# Patient Record
Sex: FEMALE | Race: AMERICAN INDIAN OR ALASKA NATIVE
[De-identification: names, ages, dates, MRNs, and addresses within clinical notes are randomized per-mention and may not be internally consistent; named-entity substitution may affect disease eponyms.]

---

## 2022-06-10 ENCOUNTER — HOSPITAL ENCOUNTER (EMERGENCY)
Dept: HOSPITAL 5 - ED | Age: 22
Discharge: HOME | End: 2022-06-10
Payer: SELF-PAY

## 2022-06-10 VITALS — DIASTOLIC BLOOD PRESSURE: 65 MMHG | SYSTOLIC BLOOD PRESSURE: 117 MMHG

## 2022-06-10 DIAGNOSIS — Z79.899: ICD-10-CM

## 2022-06-10 DIAGNOSIS — J06.9: Primary | ICD-10-CM

## 2022-06-10 PROCEDURE — 71046 X-RAY EXAM CHEST 2 VIEWS: CPT

## 2022-06-10 PROCEDURE — 99283 EMERGENCY DEPT VISIT LOW MDM: CPT

## 2022-06-10 NOTE — XRAY REPORT
CHEST 2 VIEWS 



INDICATION / CLINICAL INFORMATION:

SOB.



COMPARISON: 

None available.



FINDINGS:



SUPPORT DEVICES: None.



HEART / MEDIASTINUM: No significant abnormality. 



LUNGS / PLEURA: No significant pulmonary or pleural abnormality. No pneumothorax. 



ADDITIONAL FINDINGS: No significant additional findings.



IMPRESSION:

1. No acute findings.



Signer Name: Osorio Ulrich Jr, MD 

Signed: 6/10/2022 3:17 PM

Workstation Name: [id number]

## 2022-06-10 NOTE — EMERGENCY DEPARTMENT REPORT
- General


Chief Complaint: Dyspnea/Respdistress


Stated Complaint: SHORTNESS OF BREATH


Time Seen by Provider: 06/10/22 21:10


Source: patient


Mode of arrival: Ambulatory


Limitations: No Limitations





- History of Present Illness


Initial Comments: 





21-year-old female presents emerged department complaining of having an asthma 

attack last week associated with wheezing and shortness of breath has been 

having some issues fully getting over the episode.  States that occasionally she

may feel [place] sensation to her chest in the form of cough for wheeze like 

aspect which triggers a degree of anxiety and breathlessness so decided come to 

emergency department to ensure that she did not have pneumonia as was 

recommended or insinuated by her friends.  She reports no hemoptysis no hematem

esis medic easy, no fever, chills, sweats


MD Complaint: rhinorrhea


-: Gradual


Severity: mild


Improves With: nothing


Worsens With: nothing


Context: sick contacts


Associated Symptoms: rhinorrhea.  denies: nasal congestion, sore throat, nausea,

vomiting, diarrhea, rash, hoarseness





- Related Data


                                  Previous Rx's











 Medication  Instructions  Recorded  Last Taken  Type


 


Albuterol Mdi (or & Nicu Only) 1 puff IH Q4-6H PRN #1 inha 06/10/22 Unknown Rx





[ProAir HFA Inhaler]    











                                    Allergies











Allergy/AdvReac Type Severity Reaction Status Date / Time


 


No Known Allergies Allergy   Verified 06/10/22 14:53














ED Review of Systems


ROS: 


Stated complaint: SHORTNESS OF BREATH


Other details as noted in HPI





Comment: All other systems reviewed and negative





ED Past Medical Hx





- Medications


Home Medications: 


                                Home Medications











 Medication  Instructions  Recorded  Confirmed  Last Taken  Type


 


Albuterol Mdi (or & Nicu Only) 1 puff IH Q4-6H PRN #1 inha 06/10/22  Unknown Rx





[ProAir HFA Inhaler]     














ED Physical Exam





- General


Limitations: No Limitations


General appearance: alert, in no apparent distress





- Head


Head exam: Present: atraumatic, normocephalic





- Eye


Eye exam: Present: normal appearance, PERRL, EOMI





- ENT


ENT exam: Present: mucous membranes moist





- Neck


Neck exam: Present: normal inspection, full ROM





- Respiratory


Respiratory exam: Present: normal lung sounds bilaterally.  Absent: respiratory 

distress





- Cardiovascular


Cardiovascular Exam: Present: regular rate, normal rhythm.  Absent: systolic 

murmur, diastolic murmur, rubs, gallop





- GI/Abdominal


GI/Abdominal exam: Present: soft, normal bowel sounds





- Extremities Exam


Extremities exam: Present: normal inspection





- Back Exam


Back exam: Present: normal inspection





- Neurological Exam


Neurological exam: Present: alert, oriented X3





- Psychiatric


Psychiatric exam: Present: normal affect, normal mood





- Skin


Skin exam: Present: warm, dry, intact, normal color.  Absent: rash





ED Course


                                   Vital Signs











  06/10/22





  14:55


 


Temperature 98 F


 


Pulse Rate 98 H


 


Respiratory 16





Rate 


 


Blood Pressure 119/71





[Left] 


 


O2 Sat by Pulse 98





Oximetry 














ED Medical Decision Making





- Radiology Data


Radiology results: report reviewed





[facility]  


                                     [address] [place] [address]  


 


                                            XRay Report   


                                               Signed  


 


Patient: ZENA OLEARY                                                     

           MR#: [id number]  


[id number]          


: 2000                                                                

Acct:[id number]      


 


Age/Sex: 21 / F                                                                

ADM Date: 06/10/22     


 


Loc: ED       


Attending Dr:   


 


 


Ordering Physician: ED MD CHITO  


Date of Service: 06/10/22  


Procedure(s): XR chest routine 2V  


Accession Number(s): [id number]  


 


cc: ED DOC, MD   


 


Fluoro Time In Minutes:   


 


CHEST 2 VIEWS   


 


 INDICATION / CLINICAL INFORMATION:  


 SOB.  


 


 COMPARISON:   


 None available.  


 


 FINDINGS:  


 


 SUPPORT DEVICES: None.  


 


 HEART / MEDIASTINUM: No significant abnormality.   


 


 LUNGS / PLEURA: No significant pulmonary or pleural abnormality. No 

pneumothorax.   


 


 ADDITIONAL FINDINGS: No significant additional findings.  


 


 IMPRESSION:  


 1. No acute findings.  


 


 Signer Name: Osorio Rose Jr, MD   


 Signed: 6/10/2022 3:17 PM  


 Workstation Name: [id number]   


 


 


Transcribed By: TTR  


Dictated By: OSORIO ROSE JR, MD  


Electronically Authenticated By: OSORIO ROSE JR, MD    


Signed Date/Time: 06/10/22 1517                                


 


 


 


DD/DT: 06/10/22 1517                                                            

  


TD/TT:

















Print


Critical care attestation.: 


If time is entered above; I have spent that time in minutes in the direct care 

of this critically ill patient, excluding procedure time.








ED Disposition


Clinical Impression: 


 URI (upper respiratory infection)





Disposition:  HOME / SELF CARE / HOMELESS


Is pt being admited?: No


Does the pt Need Aspirin: No


Condition: Stable


Instructions:  Cool Mist Vaporizer, Upper Respiratory Infection, Adult, Cough, 

Adult


Additional Instructions: 


Given evaluate emergency department today for your congestion, cough and fevers.

  Your evaluation suggest that your symptoms are most likely due to a viral 

illness.  Which will improve on its own with rest and fluids.





Recommend that you take ibuprofen 600 mg every 6 hours or Tylenol 1950 every

 6 hours as needed for fever.  If needed you can alternate these medications so 

that you take one medication every 3 hours.  For instance at noon take ibuprofen

 and at 3 PM take Tylenol and then at 6 PM take ibuprofen.





Please schedule an appointment for follow-up with your primary care physician 

within a week.





Return to the emergency department if you experience worsening cough, 

uncontrollable fevers that not being controlled with Tylenol ibuprofen.  

Recurrent vomiting, chest pain, shortness of breath or any other symptoms 

suggesting that your condition is worsening.


Referrals: 


KAYKAY ZACARIAS MD [Primary Care Provider] - 3-5 Days

## 2022-08-24 ENCOUNTER — HOSPITAL ENCOUNTER (EMERGENCY)
Dept: HOSPITAL 5 - ED | Age: 22
LOS: 1 days | Discharge: HOME | End: 2022-08-25
Payer: SELF-PAY

## 2022-08-24 VITALS — SYSTOLIC BLOOD PRESSURE: 112 MMHG | DIASTOLIC BLOOD PRESSURE: 70 MMHG
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
## 2022-08-24 DIAGNOSIS — J45.909: ICD-10-CM
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
## 2022-08-24 DIAGNOSIS — S80.819A: ICD-10-CM
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
## 2022-08-24 DIAGNOSIS — S00.81XA: Primary | ICD-10-CM

## 2022-08-24 PROCEDURE — 96374 THER/PROPH/DIAG INJ IV PUSH: CPT

## 2022-08-24 PROCEDURE — 80048 BASIC METABOLIC PNL TOTAL CA: CPT

## 2022-08-24 PROCEDURE — 73070 X-RAY EXAM OF ELBOW: CPT

## 2022-08-24 PROCEDURE — 73590 X-RAY EXAM OF LOWER LEG: CPT

## 2022-08-24 PROCEDURE — 99284 EMERGENCY DEPT VISIT MOD MDM: CPT

## 2022-08-24 PROCEDURE — 73100 X-RAY EXAM OF WRIST: CPT

## 2022-08-24 PROCEDURE — 85025 COMPLETE CBC W/AUTO DIFF WBC: CPT

## 2022-08-24 PROCEDURE — 82550 ASSAY OF CK (CPK): CPT

## 2022-08-24 PROCEDURE — 74177 CT ABD & PELVIS W/CONTRAST: CPT

## 2022-08-24 PROCEDURE — 72125 CT NECK SPINE W/O DYE: CPT

## 2022-08-24 PROCEDURE — 84703 CHORIONIC GONADOTROPIN ASSAY: CPT

## 2022-08-24 PROCEDURE — 70450 CT HEAD/BRAIN W/O DYE: CPT

## 2022-08-24 PROCEDURE — 70486 CT MAXILLOFACIAL W/O DYE: CPT

## 2022-08-24 PROCEDURE — 71260 CT THORAX DX C+: CPT

## 2022-08-24 PROCEDURE — 73560 X-RAY EXAM OF KNEE 1 OR 2: CPT

## 2022-08-24 PROCEDURE — 80076 HEPATIC FUNCTION PANEL: CPT

## 2022-08-24 PROCEDURE — 73600 X-RAY EXAM OF ANKLE: CPT

## 2022-08-24 PROCEDURE — 73020 X-RAY EXAM OF SHOULDER: CPT

## 2022-08-24 PROCEDURE — 36415 COLL VENOUS BLD VENIPUNCTURE: CPT

## 2022-08-24 PROCEDURE — 96375 TX/PRO/DX INJ NEW DRUG ADDON: CPT

## 2022-08-24 PROCEDURE — 96361 HYDRATE IV INFUSION ADD-ON: CPT

## 2022-08-25 LAB
ALBUMIN SERPL-MCNC: 4.3 G/DL (ref 3.9–5)
ALT SERPL-CCNC: 10 UNITS/L (ref 7–56)
BASOPHILS # (AUTO): 0 K/MM3 (ref 0–0.1)
BASOPHILS NFR BLD AUTO: 0.4 % (ref 0–1.8)
BILIRUB DIRECT SERPL-MCNC: < 0.2 MG/DL (ref 0–0.2)
BUN SERPL-MCNC: 8 MG/DL (ref 7–17)
BUN/CREAT SERPL: 11 %
CALCIUM SERPL-MCNC: 9 MG/DL (ref 8.4–10.2)
EOSINOPHIL # BLD AUTO: 0 K/MM3 (ref 0–0.4)
EOSINOPHIL NFR BLD AUTO: 0.4 % (ref 0–4.3)
HCT VFR BLD CALC: 38.8 % (ref 30.3–42.9)
HEMOLYSIS INDEX: 7
HGB BLD-MCNC: 12.2 GM/DL (ref 10.1–14.3)
LYMPHOCYTES # BLD AUTO: 2.3 K/MM3 (ref 1.2–5.4)
LYMPHOCYTES NFR BLD AUTO: 25.7 % (ref 13.4–35)
MCHC RBC AUTO-ENTMCNC: 32 % (ref 30–34)
MCV RBC AUTO: 83 FL (ref 79–97)
MONOCYTES # (AUTO): 0.7 K/MM3 (ref 0–0.8)
MONOCYTES % (AUTO): 8.4 % (ref 0–7.3)
PLATELET # BLD: 193 K/MM3 (ref 140–440)
RBC # BLD AUTO: 4.66 M/MM3 (ref 3.65–5.03)
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
